# Patient Record
Sex: FEMALE | Race: WHITE
[De-identification: names, ages, dates, MRNs, and addresses within clinical notes are randomized per-mention and may not be internally consistent; named-entity substitution may affect disease eponyms.]

---

## 2017-02-15 ENCOUNTER — HOSPITAL ENCOUNTER (EMERGENCY)
Dept: HOSPITAL 4 - SED | Age: 52
Discharge: HOME | End: 2017-02-15
Payer: COMMERCIAL

## 2017-02-15 VITALS — HEIGHT: 68 IN | WEIGHT: 200 LBS | BODY MASS INDEX: 30.31 KG/M2

## 2017-02-15 VITALS
HEART RATE: 85 BPM | TEMPERATURE: 98.2 F | SYSTOLIC BLOOD PRESSURE: 104 MMHG | RESPIRATION RATE: 26 BRPM | OXYGEN SATURATION: 94 % | DIASTOLIC BLOOD PRESSURE: 53 MMHG

## 2017-02-15 VITALS
SYSTOLIC BLOOD PRESSURE: 98 MMHG | HEART RATE: 85 BPM | RESPIRATION RATE: 24 BRPM | TEMPERATURE: 98.8 F | OXYGEN SATURATION: 96 % | DIASTOLIC BLOOD PRESSURE: 53 MMHG

## 2017-02-15 DIAGNOSIS — J40: Primary | ICD-10-CM

## 2017-02-15 LAB
ALBUMIN SERPL BCP-MCNC: 3.4 G/DL (ref 3.4–4.8)
ALT SERPL W P-5'-P-CCNC: 14 U/L (ref 12–78)
ANION GAP SERPL CALCULATED.3IONS-SCNC: 9 MMOL/L (ref 5–15)
AST SERPL W P-5'-P-CCNC: 17 U/L (ref 10–37)
BASOPHILS # BLD AUTO: 0 K/UL (ref 0–0.2)
BASOPHILS NFR BLD AUTO: 0.3 % (ref 0–2)
BILIRUB SERPL-MCNC: 0.2 MG/DL (ref 0–1)
BUN SERPL-MCNC: 14 MG/DL (ref 8–21)
CALCIUM SERPL-MCNC: 10.1 MG/DL (ref 8.4–11)
CHLORIDE SERPL-SCNC: 103 MMOL/L (ref 98–107)
CREAT SERPL-MCNC: 1.54 MG/DL (ref 0.55–1.3)
EOSINOPHIL # BLD AUTO: 0.4 K/UL (ref 0–0.4)
EOSINOPHIL NFR BLD AUTO: 2.7 % (ref 0–4)
ERYTHROCYTE [DISTWIDTH] IN BLOOD BY AUTOMATED COUNT: 14.1 % (ref 9–15)
GFR SERPL CREATININE-BSD FRML MDRD: 46 ML/MIN (ref 90–?)
GLUCOSE SERPL-MCNC: 109 MG/DL (ref 70–99)
HCT VFR BLD AUTO: 29.6 % (ref 36–48)
HGB BLD-MCNC: 9.9 G/DL (ref 12–16)
LYMPHOCYTES # BLD AUTO: 1.7 K/UL (ref 1–5.5)
LYMPHOCYTES NFR BLD AUTO: 12.8 % (ref 20.5–51.5)
MCH RBC QN AUTO: 30 PG (ref 27–31)
MCHC RBC AUTO-ENTMCNC: 34 % (ref 32–36)
MCV RBC AUTO: 88 FL (ref 79–98)
MONOCYTES # BLD MANUAL: 0.6 K/UL (ref 0–1)
MONOCYTES # BLD MANUAL: 4.8 % (ref 1.7–9.3)
NEUTROPHILS # BLD AUTO: 10.3 K/UL (ref 1.8–7.7)
NEUTROPHILS NFR BLD AUTO: 79.4 % (ref 40–70)
PLATELET # BLD AUTO: 411 K/UL (ref 130–430)
POTASSIUM SERPL-SCNC: 3.6 MMOL/L (ref 3.5–5.1)
PROT SERPL-MCNC: 7.6 G/DL (ref 6.4–8.3)
RBC # BLD AUTO: 3.36 MIL/UL (ref 4.2–6.2)
SODIUM SERPLBLD-SCNC: 142 MMOL/L (ref 136–145)
WBC # BLD AUTO: 13 K/UL (ref 4.8–10.8)

## 2017-02-15 NOTE — NUR
pt. resting, verbalized comfort, denies SOB, SpO2 100% states that she wants to 
go home, MD notified

## 2017-02-15 NOTE — NUR
Patient given written and verbal discharge instructions and verbalizes 
understanding.  ER MD discussed with patient the results and treatment 
provided. Patient in stable condition. ID arm band removed. 

Rx of Doxycycline given. Patient educated on pain management and to follow up 
with PMD. Pain Scale 0/10

Opportunity for questions provided and answered.

## 2017-02-15 NOTE — NUR
PT. TO THE ER ON WHEELCHAIR AAOx4 VIA WHEELCHAIR C/O SOB, STATES THAT SHE HAD 
LUNG BIOPSY YESTERDAY WITH 10% LUNG COLLAPSE, TRACH IN PLACE, HX OF LARYNGEAL 
CANCER, DENIES PAIN, SpO2 99% , ON CARDIAC MONITOR

## 2017-03-27 ENCOUNTER — HOSPITAL ENCOUNTER (INPATIENT)
Dept: HOSPITAL 4 - SED | Age: 52
LOS: 3 days | Discharge: HOME | DRG: 252 | End: 2017-03-30
Attending: INTERNAL MEDICINE | Admitting: INTERNAL MEDICINE
Payer: COMMERCIAL

## 2017-03-27 VITALS
BODY MASS INDEX: 32.28 KG/M2 | WEIGHT: 213 LBS | WEIGHT: 213 LBS | BODY MASS INDEX: 32.28 KG/M2 | HEIGHT: 68 IN | HEIGHT: 68 IN

## 2017-03-27 VITALS
HEART RATE: 90 BPM | SYSTOLIC BLOOD PRESSURE: 132 MMHG | RESPIRATION RATE: 20 BRPM | TEMPERATURE: 97.6 F | OXYGEN SATURATION: 97 % | DIASTOLIC BLOOD PRESSURE: 67 MMHG

## 2017-03-27 VITALS
DIASTOLIC BLOOD PRESSURE: 61 MMHG | OXYGEN SATURATION: 100 % | RESPIRATION RATE: 18 BRPM | SYSTOLIC BLOOD PRESSURE: 131 MMHG | TEMPERATURE: 98.3 F | HEART RATE: 81 BPM

## 2017-03-27 VITALS — HEART RATE: 56 BPM | DIASTOLIC BLOOD PRESSURE: 61 MMHG | SYSTOLIC BLOOD PRESSURE: 131 MMHG

## 2017-03-27 DIAGNOSIS — Z85.89: ICD-10-CM

## 2017-03-27 DIAGNOSIS — E03.9: ICD-10-CM

## 2017-03-27 DIAGNOSIS — K44.9: ICD-10-CM

## 2017-03-27 DIAGNOSIS — Z85.21: ICD-10-CM

## 2017-03-27 DIAGNOSIS — Z90.710: ICD-10-CM

## 2017-03-27 DIAGNOSIS — J18.9: ICD-10-CM

## 2017-03-27 DIAGNOSIS — Z93.0: ICD-10-CM

## 2017-03-27 DIAGNOSIS — R13.10: ICD-10-CM

## 2017-03-27 DIAGNOSIS — Z87.891: ICD-10-CM
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
## 2017-03-27 DIAGNOSIS — K94.23: Primary | ICD-10-CM

## 2017-03-27 DIAGNOSIS — K21.9: ICD-10-CM

## 2017-03-27 DIAGNOSIS — C34.90: ICD-10-CM

## 2017-03-27 DIAGNOSIS — J96.10: ICD-10-CM

## 2017-03-27 LAB
ALBUMIN SERPL BCP-MCNC: 3.8 G/DL (ref 3.4–4.8)
ALT SERPL W P-5'-P-CCNC: 15 U/L (ref 12–78)
ANION GAP SERPL CALCULATED.3IONS-SCNC: 9 MMOL/L (ref 5–15)
APPEARANCE UR: (no result)
AST SERPL W P-5'-P-CCNC: 14 U/L (ref 10–37)
BACTERIA URNS QL MICRO: (no result) /HPF
BASOPHILS # BLD AUTO: 0 K/UL (ref 0–0.2)
BASOPHILS NFR BLD AUTO: 0.5 % (ref 0–2)
BILIRUB SERPL-MCNC: 0.3 MG/DL (ref 0–1)
BILIRUB UR QL STRIP: NEGATIVE
BUN SERPL-MCNC: 18 MG/DL (ref 8–21)
CALCIUM SERPL-MCNC: 9.9 MG/DL (ref 8.4–11)
CHLORIDE SERPL-SCNC: 104 MMOL/L (ref 98–107)
COLOR UR: YELLOW
CREAT SERPL-MCNC: 1.53 MG/DL (ref 0.55–1.3)
EOSINOPHIL # BLD AUTO: 0.3 K/UL (ref 0–0.4)
EOSINOPHIL NFR BLD AUTO: 3.9 % (ref 0–4)
ERYTHROCYTE [DISTWIDTH] IN BLOOD BY AUTOMATED COUNT: 14 % (ref 9–15)
GFR SERPL CREATININE-BSD FRML MDRD: 46 ML/MIN (ref 90–?)
GLUCOSE SERPL-MCNC: 93 MG/DL (ref 70–99)
GLUCOSE UR STRIP-MCNC: NEGATIVE MG/DL
HCT VFR BLD AUTO: 27.5 % (ref 36–48)
HGB BLD-MCNC: 9.7 G/DL (ref 12–16)
HGB UR QL STRIP: NEGATIVE
INR PPP: 1.1 (ref 0.8–1.2)
KETONES UR STRIP-MCNC: NEGATIVE MG/DL
LEUKOCYTE ESTERASE UR QL STRIP: (no result)
LYMPHOCYTES # BLD AUTO: 1.8 K/UL (ref 1–5.5)
LYMPHOCYTES NFR BLD AUTO: 20.8 % (ref 20.5–51.5)
MCH RBC QN AUTO: 31 PG (ref 27–31)
MCHC RBC AUTO-ENTMCNC: 35 % (ref 32–36)
MCV RBC AUTO: 86 FL (ref 79–98)
MONOCYTES # BLD MANUAL: 0.4 K/UL (ref 0–1)
MONOCYTES # BLD MANUAL: 4.5 % (ref 1.7–9.3)
NEUTROPHILS # BLD AUTO: 6.1 K/UL (ref 1.8–7.7)
NEUTROPHILS NFR BLD AUTO: 70.3 % (ref 40–70)
NITRITE UR QL STRIP: NEGATIVE
PH UR STRIP: 8 [PH] (ref 5–8)
PLATELET # BLD AUTO: 338 K/UL (ref 130–430)
POTASSIUM SERPL-SCNC: 3.6 MMOL/L (ref 3.5–5.1)
PROT SERPL-MCNC: 8 G/DL (ref 6.4–8.3)
PROT UR QL STRIP: NEGATIVE
PROTHROMBIN TIME: 12 SECS (ref 9.5–12.5)
RBC # BLD AUTO: 3.18 MIL/UL (ref 4.2–6.2)
RBC #/AREA URNS HPF: (no result) /HPF (ref 0–3)
SODIUM SERPLBLD-SCNC: 140 MMOL/L (ref 136–145)
SP GR UR STRIP: 1.01 (ref 1–1.03)
UROBILINOGEN UR STRIP-MCNC: 0.2 MG/DL (ref 0.2–1)
WBC # BLD AUTO: 8.6 K/UL (ref 4.8–10.8)

## 2017-03-27 RX ADMIN — TEMAZEPAM SCH MG: 15 CAPSULE ORAL at 21:00

## 2017-03-27 RX ADMIN — ALBUTEROL SULFATE SCH MG: 2.5 SOLUTION RESPIRATORY (INHALATION) at 23:05

## 2017-03-27 RX ADMIN — MORPHINE SULFATE PRN MG: 2 INJECTION, SOLUTION INTRAMUSCULAR; INTRAVENOUS at 22:04

## 2017-03-27 RX ADMIN — IPRATROPIUM BROMIDE SCH MG: 0.5 SOLUTION RESPIRATORY (INHALATION) at 23:05

## 2017-03-27 NOTE — NUR
Medication reconciliation completed with information provided by PT. Any prior 
medication reconciliation on file was reviewed and corrected.

## 2017-03-27 NOTE — NUR
PAGED:

I PAGED DR. PONCE @ 2131 DR. SIFUENTES ON CALL VINAY

I SPOKE WITH [employer] EXCHANGE THAT IS THE SPELLING SHE GAVE ME

DR. SIFUENTES CALLED US BACK @ 2140

## 2017-03-27 NOTE — NUR
Patient will be admitted to care of .  Admitted to M/S unit.  Will go 
to room 103A.  Belongings list completed.  Summary report printed. Report given 
to Kinga FORD

## 2017-03-27 NOTE — NUR
ADMISSION NOTE

Received patient from ER via gurney. Patient admitted with diagnosis of GT malfunction. 
Patient is awake, alert, oriented X 4. Patient oriented to hospital room, call light, 
toileting, pain management and safety-teach back done. Patient informed that Christen will be 
her nurse and that their room number is 103a. Personal belongings checked and Belongings 
List documented. Call light within reach.

## 2017-03-27 NOTE — NUR
SPOKE TO DR. SIFUENTES



PATIENT HAD COMPLAINTS OF PAIN. SPOKE WITH DR. SIFUENTES REGARDING ORDERS FOR PAIN MEDICATION. 
WILL INPUT NEW ORDERS.

## 2017-03-27 NOTE — NUR
OPENING NOTES



PATIENT IS IN BED RESTING. VITAL SIGNS ARE STABLE. NO SIGNS OF DISTRESS. BREATHING IS NON 
LABORED. G-TUBE IS SECURED TO THE ABDOMEN. IV IS PATIENT AND SHOWS NO SIGNS OF 
COMPLICATIONS. PATIENT INSTRUCTED TO CALL FOR ASSISTANCE. BED ALARM IS ON. CALL LIGHT IS 
WITHIN REACH. WILL CONTINUE TO MONITOR.

## 2017-03-27 NOTE — NUR
pt presents to ED for G-tube placement, pt stated that she followed up with 
PCP, XR result showed her G-tube was in Sub-Q space, and was recommended to 
replace G-tube at ER. Trach present, pt ambulatory, A&Ox4, denies SOB or 
chestpain, denies N/V/D, denies abdominal pain. Current G-tube at mid-abdomen, 
no bleeding noted. Will continue to monitor

## 2017-03-28 VITALS
RESPIRATION RATE: 20 BRPM | OXYGEN SATURATION: 100 % | HEART RATE: 80 BPM | SYSTOLIC BLOOD PRESSURE: 107 MMHG | DIASTOLIC BLOOD PRESSURE: 58 MMHG | TEMPERATURE: 96.2 F

## 2017-03-28 VITALS
RESPIRATION RATE: 15 BRPM | SYSTOLIC BLOOD PRESSURE: 99 MMHG | DIASTOLIC BLOOD PRESSURE: 56 MMHG | TEMPERATURE: 97.6 F | OXYGEN SATURATION: 99 % | HEART RATE: 72 BPM

## 2017-03-28 VITALS
TEMPERATURE: 98.9 F | HEART RATE: 78 BPM | SYSTOLIC BLOOD PRESSURE: 107 MMHG | DIASTOLIC BLOOD PRESSURE: 56 MMHG | OXYGEN SATURATION: 99 % | RESPIRATION RATE: 18 BRPM

## 2017-03-28 VITALS
DIASTOLIC BLOOD PRESSURE: 53 MMHG | RESPIRATION RATE: 16 BRPM | HEART RATE: 66 BPM | OXYGEN SATURATION: 100 % | SYSTOLIC BLOOD PRESSURE: 95 MMHG | TEMPERATURE: 98 F

## 2017-03-28 VITALS
RESPIRATION RATE: 16 BRPM | TEMPERATURE: 98.9 F | OXYGEN SATURATION: 99 % | HEART RATE: 74 BPM | SYSTOLIC BLOOD PRESSURE: 94 MMHG | DIASTOLIC BLOOD PRESSURE: 59 MMHG

## 2017-03-28 VITALS
DIASTOLIC BLOOD PRESSURE: 48 MMHG | SYSTOLIC BLOOD PRESSURE: 110 MMHG | TEMPERATURE: 97 F | OXYGEN SATURATION: 99 % | RESPIRATION RATE: 18 BRPM | HEART RATE: 86 BPM

## 2017-03-28 RX ADMIN — IPRATROPIUM BROMIDE PRN MG: 0.5 SOLUTION RESPIRATORY (INHALATION) at 21:49

## 2017-03-28 RX ADMIN — IPRATROPIUM BROMIDE SCH MG: 0.5 SOLUTION RESPIRATORY (INHALATION) at 02:20

## 2017-03-28 RX ADMIN — MORPHINE SULFATE PRN MG: 2 INJECTION, SOLUTION INTRAMUSCULAR; INTRAVENOUS at 07:56

## 2017-03-28 RX ADMIN — ALBUTEROL SULFATE PRN MG: 2.5 SOLUTION RESPIRATORY (INHALATION) at 21:49

## 2017-03-28 RX ADMIN — ALBUTEROL SULFATE SCH MG: 2.5 SOLUTION RESPIRATORY (INHALATION) at 19:46

## 2017-03-28 RX ADMIN — MORPHINE SULFATE PRN MG: 2 INJECTION, SOLUTION INTRAMUSCULAR; INTRAVENOUS at 23:42

## 2017-03-28 RX ADMIN — ALBUTEROL SULFATE SCH MG: 2.5 SOLUTION RESPIRATORY (INHALATION) at 23:00

## 2017-03-28 RX ADMIN — MORPHINE SULFATE PRN MG: 2 INJECTION, SOLUTION INTRAMUSCULAR; INTRAVENOUS at 19:04

## 2017-03-28 RX ADMIN — IPRATROPIUM BROMIDE SCH MG: 0.5 SOLUTION RESPIRATORY (INHALATION) at 19:46

## 2017-03-28 RX ADMIN — ALBUTEROL SULFATE SCH MG: 2.5 SOLUTION RESPIRATORY (INHALATION) at 06:15

## 2017-03-28 RX ADMIN — LEVOTHYROXINE SODIUM SCH MG: 25 TABLET ORAL at 09:00

## 2017-03-28 RX ADMIN — IPRATROPIUM BROMIDE SCH MG: 0.5 SOLUTION RESPIRATORY (INHALATION) at 06:15

## 2017-03-28 RX ADMIN — IPRATROPIUM BROMIDE SCH MG: 0.5 SOLUTION RESPIRATORY (INHALATION) at 23:00

## 2017-03-28 RX ADMIN — DEXTROSE AND SODIUM CHLORIDE SCH MLS/HR: 5; 450 INJECTION, SOLUTION INTRAVENOUS at 18:55

## 2017-03-28 RX ADMIN — ALBUTEROL SULFATE SCH MG: 2.5 SOLUTION RESPIRATORY (INHALATION) at 16:37

## 2017-03-28 RX ADMIN — ALBUTEROL SULFATE SCH MG: 2.5 SOLUTION RESPIRATORY (INHALATION) at 02:20

## 2017-03-28 RX ADMIN — MORPHINE SULFATE PRN MG: 2 INJECTION, SOLUTION INTRAMUSCULAR; INTRAVENOUS at 02:36

## 2017-03-28 RX ADMIN — TEMAZEPAM SCH MG: 15 CAPSULE ORAL at 21:00

## 2017-03-28 RX ADMIN — MORPHINE SULFATE PRN MG: 2 INJECTION, SOLUTION INTRAMUSCULAR; INTRAVENOUS at 14:41

## 2017-03-28 RX ADMIN — DEXTROSE AND SODIUM CHLORIDE SCH MLS/HR: 5; 450 INJECTION, SOLUTION INTRAVENOUS at 02:43

## 2017-03-28 RX ADMIN — IPRATROPIUM BROMIDE SCH MG: 0.5 SOLUTION RESPIRATORY (INHALATION) at 16:37

## 2017-03-28 NOTE — NUR
ROUNDS

PT STABLE...BOYFRIEND AT BEDSIDE..PT AWARE OF GT PLACEMENT TOMORROW, TIME UNKNOWN...IVF 
INFUSING WELL..O2 MASK TO TRACH IN PLACE....WILL CONT TO MNITOR

## 2017-03-28 NOTE — NUR
PATIENT RESTING:

Patient resting quietly. No acute distress noted. Vital signs within normal range.

## 2017-03-28 NOTE — NUR
AM ROUNDS

Pt A/O x4, c/o pain at this time...Pt on 5L oxygen via trach. Pt with GT to Left quad..GT 
not functioning at this time..Pt aware of GI consult for new GT placement...IVF infusing 
well to LH...Call light/phone w/in reach..Will cont to monitor

## 2017-03-28 NOTE — NUR
Nutrition Update



Beau Scale 18 noted.

Pt admitted for GT malfunction.

Diet: NPO

BMI: 32.4 kg/m2



RD to follow per nutrition care standards.

## 2017-03-28 NOTE — NUR
ROUNDS.



PATIENT IS IN BED SLEEPING. NO SIGNS OF DISTRESS. BREATHING IS NON LABORED. CALL LIGHT IS 
WITHIN REACH. BED ALARM IS ON. WILL CONTINUE TO MONITOR.

## 2017-03-28 NOTE — NUR
ROUNDS



PATIENT IS IN BED RESTING. NO SIGNS OF DISTRESS. BREATHING IS NON LABORED. SAFETY MEASURES 
ARE IN PLACE. WILL CONTINUE TO MONITOR.

## 2017-03-28 NOTE — NUR
ROUNDS



REPOSITIONED PATIENT IN BED FOR COMFORT. PATIENT REFUSED THE BED ALARM. EDUCATED PATIENT ON 
THE IMPORTANCE OF BED ALARM. EDUCATED PATIENT ON THE IMPORTANCE OF CALLING FOR ASSISTANCE. 
PATIENT VERBALIZED UNDERSTANDING. WILL CONTINUE TO MONITOR.

## 2017-03-28 NOTE — NUR
ROUNDS



PATIENT IS IN BED SLEEPING. NO SIGNS OF DISTRESS. BREATHING IS NON LABORED. WILL CONTINUE TO 
MONITOR. BED ALARM IS ON. CALL LIGHT IS WITHIN REACH.

## 2017-03-28 NOTE — NUR
OPENING NOTES



PATIENT IS IN A/OX4. NO SIGNS OF DISTRESS. BREATHING IS NON LABORED. IV IS PATENT AND SHOWS 
NO SIGNS OF COMPLICATIONS. PATIENT HAS NO COMPLAINTS OF PAIN. PATIENT INSTRUCTED TO CALL FOR 
ASSISTANCE. CALL LIGHT IS WITHIN REACH. SAFETY MEASURES ARE IN PLACE. PATIENT REFUSED BED 
ALARM. PATIENT EDUCATED ON THE IMPORTANCE OF BED ALARM AND CALLING FOR ASSISTANCE. PATIENT 
VERBALIZED UNDERSTANDING. PATIENT INSTRUCTED TO CALL FOR ASSISTANCE. WILL CONTINUE TO 
MONITOR.

## 2017-03-29 VITALS
RESPIRATION RATE: 17 BRPM | DIASTOLIC BLOOD PRESSURE: 48 MMHG | SYSTOLIC BLOOD PRESSURE: 85 MMHG | OXYGEN SATURATION: 99 % | HEART RATE: 74 BPM | TEMPERATURE: 98.3 F

## 2017-03-29 VITALS
TEMPERATURE: 98.3 F | OXYGEN SATURATION: 98 % | RESPIRATION RATE: 16 BRPM | SYSTOLIC BLOOD PRESSURE: 85 MMHG | HEART RATE: 79 BPM | DIASTOLIC BLOOD PRESSURE: 69 MMHG

## 2017-03-29 VITALS
TEMPERATURE: 96.6 F | HEART RATE: 84 BPM | RESPIRATION RATE: 16 BRPM | OXYGEN SATURATION: 93 % | DIASTOLIC BLOOD PRESSURE: 56 MMHG | SYSTOLIC BLOOD PRESSURE: 115 MMHG

## 2017-03-29 VITALS
RESPIRATION RATE: 16 BRPM | OXYGEN SATURATION: 96 % | TEMPERATURE: 98.4 F | DIASTOLIC BLOOD PRESSURE: 60 MMHG | SYSTOLIC BLOOD PRESSURE: 92 MMHG | HEART RATE: 72 BPM

## 2017-03-29 VITALS
HEART RATE: 74 BPM | RESPIRATION RATE: 16 BRPM | SYSTOLIC BLOOD PRESSURE: 103 MMHG | OXYGEN SATURATION: 92 % | DIASTOLIC BLOOD PRESSURE: 64 MMHG | TEMPERATURE: 97.2 F

## 2017-03-29 LAB
INR PPP: 1.1 (ref 0.8–1.2)
PROTHROMBIN TIME: 11.8 SECS (ref 9.5–12.5)

## 2017-03-29 PROCEDURE — 0DH64UZ INSERTION OF FEEDING DEVICE INTO STOMACH, PERCUTANEOUS ENDOSCOPIC APPROACH: ICD-10-PCS | Performed by: INTERNAL MEDICINE

## 2017-03-29 RX ADMIN — IPRATROPIUM BROMIDE PRN MG: 0.5 SOLUTION RESPIRATORY (INHALATION) at 16:58

## 2017-03-29 RX ADMIN — ALBUTEROL SULFATE SCH MG: 2.5 SOLUTION RESPIRATORY (INHALATION) at 11:20

## 2017-03-29 RX ADMIN — IPRATROPIUM BROMIDE SCH MG: 0.5 SOLUTION RESPIRATORY (INHALATION) at 11:20

## 2017-03-29 RX ADMIN — IPRATROPIUM BROMIDE SCH MG: 0.5 SOLUTION RESPIRATORY (INHALATION) at 15:55

## 2017-03-29 RX ADMIN — MORPHINE SULFATE PRN MG: 2 INJECTION, SOLUTION INTRAMUSCULAR; INTRAVENOUS at 03:38

## 2017-03-29 RX ADMIN — IPRATROPIUM BROMIDE SCH MG: 0.5 SOLUTION RESPIRATORY (INHALATION) at 21:32

## 2017-03-29 RX ADMIN — ALBUTEROL SULFATE PRN MG: 2.5 SOLUTION RESPIRATORY (INHALATION) at 05:59

## 2017-03-29 RX ADMIN — ALBUTEROL SULFATE SCH MG: 2.5 SOLUTION RESPIRATORY (INHALATION) at 23:28

## 2017-03-29 RX ADMIN — DEXTROSE AND SODIUM CHLORIDE SCH MLS/HR: 5; 450 INJECTION, SOLUTION INTRAVENOUS at 23:50

## 2017-03-29 RX ADMIN — LEVOFLOXACIN SCH MLS/HR: 5 INJECTION, SOLUTION INTRAVENOUS at 18:34

## 2017-03-29 RX ADMIN — MORPHINE SULFATE PRN MG: 4 INJECTION INTRAVENOUS at 18:40

## 2017-03-29 RX ADMIN — IPRATROPIUM BROMIDE SCH MG: 0.5 SOLUTION RESPIRATORY (INHALATION) at 23:28

## 2017-03-29 RX ADMIN — MORPHINE SULFATE PRN MG: 2 INJECTION, SOLUTION INTRAMUSCULAR; INTRAVENOUS at 22:50

## 2017-03-29 RX ADMIN — LEVOTHYROXINE SODIUM SCH MG: 25 TABLET ORAL at 09:00

## 2017-03-29 RX ADMIN — DEXTROSE AND SODIUM CHLORIDE SCH MLS/HR: 5; 450 INJECTION, SOLUTION INTRAVENOUS at 03:51

## 2017-03-29 RX ADMIN — DEXTROSE AND SODIUM CHLORIDE SCH MLS/HR: 5; 450 INJECTION, SOLUTION INTRAVENOUS at 07:24

## 2017-03-29 RX ADMIN — ALBUTEROL SULFATE PRN MG: 2.5 SOLUTION RESPIRATORY (INHALATION) at 16:57

## 2017-03-29 RX ADMIN — IPRATROPIUM BROMIDE PRN MG: 0.5 SOLUTION RESPIRATORY (INHALATION) at 05:59

## 2017-03-29 RX ADMIN — MORPHINE SULFATE PRN MG: 2 INJECTION, SOLUTION INTRAMUSCULAR; INTRAVENOUS at 12:04

## 2017-03-29 RX ADMIN — TEMAZEPAM SCH MG: 15 CAPSULE ORAL at 21:29

## 2017-03-29 RX ADMIN — MORPHINE SULFATE PRN MG: 4 INJECTION INTRAVENOUS at 14:06

## 2017-03-29 RX ADMIN — ALBUTEROL SULFATE SCH MG: 2.5 SOLUTION RESPIRATORY (INHALATION) at 21:32

## 2017-03-29 RX ADMIN — ALBUTEROL SULFATE SCH MG: 2.5 SOLUTION RESPIRATORY (INHALATION) at 15:55

## 2017-03-29 NOTE — NUR
ROUNDS



PATIENT IS IN BED SLEEPING. BREATHING IS NON LABORED. NO SIGNS OF DISTRESS. CALL LIGHT IS 
WITHIN REACH. SAFETY MEASURES ARE IN PLACE. WILL CONTINUE TO MONITOR.

## 2017-03-29 NOTE — NUR
PT STATED THAT SHE IS UNABLE TO TOLERATED TUBE FEEDING THAT [facility] OFFERS

PT STATES SHE UNABLE TO TOLERATED FEEDINGS THAT ARE PROVIDED HERE AT [facility]..PT STATES THAT 
SHE TAKES BOOST AND MIXES IT WITH MILK AND TAKES IT BOLUS VIA GT..PT ANXIOUS TO GO 
HOME...INFORMED PT THAT I NEED TO PAGE DR CABELLO TO INFORM HIM...

## 2017-03-29 NOTE — NUR
CLOSING NOTES



PATIENT IS IN BED RESTING AND WATCHING TV. NO SIGNS OF DISTRESS. BREATHING IS NON LABORED. 
IV IS PATENT AND SHOWS NO SIGNS OF COMPLICATIONS. WILL ENDORSE TO THE MORNING NURSE.

## 2017-03-29 NOTE — NUR
Nutrition Note



RD s/w pt at bedside who stated she would like to try Boost Plus supplement diluted w/ whole 
milk as part of her TF bolus regimen,

as pt states she is not interested in trying other [facility] TF formulas as she has had bad 
experiences in the past w/ similar products (diarrhea/vomiting).



RD recommendation:

Boost Plus (237 ml carton) x5/day bolus feedings diluted w/ whole milk, Free Water Flush: 
200 ml Q3h via GT

Provides: 1800 kcal/day, 70 gm protein/day, and 1819 ml free water/day

Meets: 93% of lower end of estimated caloric needs and 91% of lower end of estimated protein 
needs



RD s/w pt's primary LVN regarding recommendation. RD to implement order via TO/RB. RD to 
continue to follow per nutrition care standards.

## 2017-03-29 NOTE — NUR
ROUNDS

PATIENT RESTING COMFORTABLY IN BED, NOT IN DISTRESS, VITALS STABLE. DENIES ANY PAIN AND 
DISCOMFORT AT THIS TIME. ASSESSMENT DONE AND DOCUMENTED. SEE FLOWSHEET. NEEDS ATTENDED TO. 
SAFETY AND FALL PRECAUTION MEASURES IN PLACED. BED IN LOW AND LOCKED POSITION. CALL LIGHT 
PLACED WITHIN REACH.

## 2017-03-29 NOTE — NUR
ROUNDS



PATIENT IS IN BED SLEEPING. NO SIGNS OF DISTRESS. BREATHING IS NON LABORED. CALL LIGHT IS 
WITHIN REACH. SAFETY MEASURES ARE IN PLACE. WILL CONTINUE TO MONITOR.

## 2017-03-29 NOTE — NUR
SPOKE WITH DR CABELLO REGARDING FEEDINGS

FEEDINGS TO START ON OR AROUND 1630..AND TO HAVE DIETITIAN SPEAK WITH PT AND TO FORMULATE A 
TUBE FEEDING THAT PT CAN TOLERATE

## 2017-03-29 NOTE — NUR
ROUNDS

PT C/O PAIN..COVERING NURSE TO MEDICATE...RESTING..OXYGEN IN PLACE VIA MASK..WILL CONT TO 
MONITOR

## 2017-03-29 NOTE — NUR
AM ROUNDS

Pt A/O, denies pain at this time...Oxygen at 5L via mask to trach..Pt awaiting GT 
replacement...IVF infusing RH..Call light/phone 

w/in reach...Will cont to lito

## 2017-03-29 NOTE — NUR
BOLUS FEEDING GIVEN

PT DID NOT WANT THE FULL AMOUNT..PT ONLY HAD 180ML OF BOOST + 120ML WATER..PT TOLERATED WELL

## 2017-03-30 VITALS
TEMPERATURE: 97.8 F | HEART RATE: 77 BPM | SYSTOLIC BLOOD PRESSURE: 103 MMHG | RESPIRATION RATE: 18 BRPM | OXYGEN SATURATION: 93 % | DIASTOLIC BLOOD PRESSURE: 53 MMHG

## 2017-03-30 VITALS
RESPIRATION RATE: 19 BRPM | OXYGEN SATURATION: 93 % | SYSTOLIC BLOOD PRESSURE: 130 MMHG | DIASTOLIC BLOOD PRESSURE: 61 MMHG | TEMPERATURE: 97.9 F | HEART RATE: 91 BPM

## 2017-03-30 VITALS
OXYGEN SATURATION: 94 % | RESPIRATION RATE: 17 BRPM | HEART RATE: 78 BPM | SYSTOLIC BLOOD PRESSURE: 90 MMHG | DIASTOLIC BLOOD PRESSURE: 47 MMHG | TEMPERATURE: 97.8 F

## 2017-03-30 VITALS — HEART RATE: 78 BPM | SYSTOLIC BLOOD PRESSURE: 90 MMHG | DIASTOLIC BLOOD PRESSURE: 47 MMHG

## 2017-03-30 VITALS
OXYGEN SATURATION: 97 % | TEMPERATURE: 97.7 F | DIASTOLIC BLOOD PRESSURE: 55 MMHG | SYSTOLIC BLOOD PRESSURE: 85 MMHG | RESPIRATION RATE: 19 BRPM | HEART RATE: 76 BPM

## 2017-03-30 VITALS
DIASTOLIC BLOOD PRESSURE: 61 MMHG | HEART RATE: 91 BPM | SYSTOLIC BLOOD PRESSURE: 130 MMHG | OXYGEN SATURATION: 93 % | TEMPERATURE: 97.9 F | RESPIRATION RATE: 19 BRPM

## 2017-03-30 VITALS
OXYGEN SATURATION: 95 % | DIASTOLIC BLOOD PRESSURE: 57 MMHG | HEART RATE: 78 BPM | RESPIRATION RATE: 18 BRPM | SYSTOLIC BLOOD PRESSURE: 111 MMHG | TEMPERATURE: 97.8 F

## 2017-03-30 RX ADMIN — ALBUTEROL SULFATE PRN MG: 2.5 SOLUTION RESPIRATORY (INHALATION) at 05:31

## 2017-03-30 RX ADMIN — ALBUTEROL SULFATE SCH MG: 2.5 SOLUTION RESPIRATORY (INHALATION) at 12:11

## 2017-03-30 RX ADMIN — LEVOFLOXACIN SCH MLS/HR: 5 INJECTION, SOLUTION INTRAVENOUS at 16:06

## 2017-03-30 RX ADMIN — IPRATROPIUM BROMIDE PRN MG: 0.5 SOLUTION RESPIRATORY (INHALATION) at 05:31

## 2017-03-30 RX ADMIN — ALBUTEROL SULFATE SCH MG: 2.5 SOLUTION RESPIRATORY (INHALATION) at 15:49

## 2017-03-30 RX ADMIN — ALBUTEROL SULFATE PRN MG: 2.5 SOLUTION RESPIRATORY (INHALATION) at 01:51

## 2017-03-30 RX ADMIN — MORPHINE SULFATE PRN MG: 4 INJECTION INTRAVENOUS at 08:24

## 2017-03-30 RX ADMIN — IPRATROPIUM BROMIDE PRN MG: 0.5 SOLUTION RESPIRATORY (INHALATION) at 01:51

## 2017-03-30 RX ADMIN — ALBUTEROL SULFATE SCH MG: 2.5 SOLUTION RESPIRATORY (INHALATION) at 12:12

## 2017-03-30 RX ADMIN — ALBUTEROL SULFATE SCH MG: 2.5 SOLUTION RESPIRATORY (INHALATION) at 02:25

## 2017-03-30 RX ADMIN — IPRATROPIUM BROMIDE SCH MG: 0.5 SOLUTION RESPIRATORY (INHALATION) at 02:25

## 2017-03-30 RX ADMIN — IPRATROPIUM BROMIDE PRN MG: 0.5 SOLUTION RESPIRATORY (INHALATION) at 03:46

## 2017-03-30 RX ADMIN — IPRATROPIUM BROMIDE SCH MG: 0.5 SOLUTION RESPIRATORY (INHALATION) at 12:12

## 2017-03-30 RX ADMIN — MORPHINE SULFATE PRN MG: 2 INJECTION, SOLUTION INTRAMUSCULAR; INTRAVENOUS at 03:30

## 2017-03-30 RX ADMIN — MORPHINE SULFATE PRN MG: 4 INJECTION INTRAVENOUS at 12:56

## 2017-03-30 RX ADMIN — ALBUTEROL SULFATE PRN MG: 2.5 SOLUTION RESPIRATORY (INHALATION) at 03:46

## 2017-03-30 RX ADMIN — LEVOTHYROXINE SODIUM SCH MG: 25 TABLET ORAL at 09:54

## 2017-03-30 RX ADMIN — IPRATROPIUM BROMIDE SCH MG: 0.5 SOLUTION RESPIRATORY (INHALATION) at 15:49

## 2017-03-30 NOTE — NUR
CLOSING NOTES

PATIENT AWAKE, VITALS STABLE, NO PAIN AND DISCOMFORT AT THIS TIME. BOLUS FEEDING GIVEN, 
TOLERATED WELL. ALL NEEDS ATTENDED TO. SAFETY MEASURES MAINTAINED. CALL LIGHT PLACED WITH 
PATIENT.

## 2017-03-30 NOTE — NUR
patient left in stable condition. with prescription and instructed the patient. goes home 
with the Boyfriend.

## 2018-01-06 ENCOUNTER — HOSPITAL ENCOUNTER (EMERGENCY)
Dept: HOSPITAL 4 - SED | Age: 53
Discharge: HOME | End: 2018-01-06
Payer: COMMERCIAL

## 2018-01-06 VITALS — SYSTOLIC BLOOD PRESSURE: 110 MMHG

## 2018-01-06 VITALS — BODY MASS INDEX: 32.58 KG/M2 | HEIGHT: 68 IN | WEIGHT: 215 LBS

## 2018-01-06 VITALS — SYSTOLIC BLOOD PRESSURE: 135 MMHG

## 2018-01-06 DIAGNOSIS — L03.115: Primary | ICD-10-CM

## 2018-01-06 DIAGNOSIS — Z85.118: ICD-10-CM

## 2018-01-06 DIAGNOSIS — J45.909: ICD-10-CM

## 2018-01-06 DIAGNOSIS — Z90.710: ICD-10-CM

## 2018-01-06 LAB
ALBUMIN SERPL BCP-MCNC: 3.3 G/DL (ref 3.4–4.8)
ALT SERPL W P-5'-P-CCNC: 24 U/L (ref 12–78)
ANION GAP SERPL CALCULATED.3IONS-SCNC: 5 MMOL/L (ref 5–15)
AST SERPL W P-5'-P-CCNC: 22 U/L (ref 10–37)
BASOPHILS # BLD AUTO: 0.1 K/UL (ref 0–0.2)
BASOPHILS NFR BLD AUTO: 1.7 % (ref 0–2)
BILIRUB SERPL-MCNC: 0.2 MG/DL (ref 0–1)
BUN SERPL-MCNC: 22 MG/DL (ref 8–21)
CALCIUM SERPL-MCNC: 8.1 MG/DL (ref 8.4–11)
CHLORIDE SERPL-SCNC: 104 MMOL/L (ref 98–107)
CREAT SERPL-MCNC: 1.33 MG/DL (ref 0.55–1.3)
EOSINOPHIL # BLD AUTO: 0.5 K/UL (ref 0–0.4)
EOSINOPHIL NFR BLD AUTO: 6.9 % (ref 0–4)
ERYTHROCYTE [DISTWIDTH] IN BLOOD BY AUTOMATED COUNT: 12.7 % (ref 9–15)
GFR SERPL CREATININE-BSD FRML MDRD: 54 ML/MIN (ref 90–?)
GLUCOSE SERPL-MCNC: 73 MG/DL (ref 70–99)
HCT VFR BLD AUTO: 32 % (ref 36–48)
HGB BLD-MCNC: 10.8 G/DL (ref 12–16)
INR PPP: 1 (ref 0.8–1.2)
LYMPHOCYTES # BLD AUTO: 1.5 K/UL (ref 1–5.5)
LYMPHOCYTES NFR BLD AUTO: 20.3 % (ref 20.5–51.5)
MCH RBC QN AUTO: 32 PG (ref 27–31)
MCHC RBC AUTO-ENTMCNC: 34 % (ref 32–36)
MCV RBC AUTO: 95 FL (ref 79–98)
MONOCYTES # BLD MANUAL: 0.6 K/UL (ref 0–1)
MONOCYTES # BLD MANUAL: 8.3 % (ref 1.7–9.3)
NEUTROPHILS # BLD AUTO: 4.6 K/UL (ref 1.8–7.7)
NEUTROPHILS NFR BLD AUTO: 62.8 % (ref 40–70)
PLATELET # BLD AUTO: 244 K/UL (ref 130–430)
POTASSIUM SERPL-SCNC: 4.8 MMOL/L (ref 3.5–5.1)
PROTHROMBIN TIME: 10.4 SECS (ref 9.5–12.5)
RBC # BLD AUTO: 3.36 MIL/UL (ref 4.2–6.2)
SODIUM SERPLBLD-SCNC: 140 MMOL/L (ref 136–145)
WBC # BLD AUTO: 7.3 K/UL (ref 4.8–10.8)

## 2025-02-04 NOTE — NUR
CLOSING NOTES



PATIENT IS IN BED RESTING COMFORTABLY AND WATCHING TV. NO SIGNS OF DISTRESS. IV IS PATENT 
AND SHOWS NO SIGNS OF COMPLICATIONS. BREATHING IS NON LABORED. REPORT GIVEN TO THE MORNING 
NURSE. Opt out